# Patient Record
Sex: FEMALE | Race: WHITE | Employment: OTHER | URBAN - METROPOLITAN AREA
[De-identification: names, ages, dates, MRNs, and addresses within clinical notes are randomized per-mention and may not be internally consistent; named-entity substitution may affect disease eponyms.]

---

## 2017-03-17 ENCOUNTER — TELEPHONE (OUTPATIENT)
Dept: NEUROLOGY | Age: 72
End: 2017-03-17

## 2017-03-17 NOTE — TELEPHONE ENCOUNTER
Contacted patient back. She just wanted to ensure neurology was the correct specialty to help with her symptoms. She will be in for an appt on March 28 to discuss with Dr. Debi Apple.

## 2017-03-17 NOTE — TELEPHONE ENCOUNTER
----- Message from Milagros Stearns sent at 3/17/2017  9:14 AM EDT -----  Regarding:  Dr. Christiano Anderson    Message: Pt wanted to speak with nurse to ask some additional questions about her situation before her appt.  [phone] number [phone]

## 2017-03-28 ENCOUNTER — OFFICE VISIT (OUTPATIENT)
Dept: NEUROLOGY | Age: 72
End: 2017-03-28

## 2017-03-28 VITALS
WEIGHT: 116 LBS | HEART RATE: 79 BPM | HEIGHT: 64 IN | OXYGEN SATURATION: 96 % | BODY MASS INDEX: 19.81 KG/M2 | SYSTOLIC BLOOD PRESSURE: 150 MMHG | DIASTOLIC BLOOD PRESSURE: 98 MMHG

## 2017-03-28 DIAGNOSIS — M54.2 ACUTE NECK PAIN: Primary | ICD-10-CM

## 2017-03-28 RX ORDER — ALENDRONATE SODIUM 70 MG/1
TABLET ORAL
Refills: 0 | COMMUNITY
Start: 2017-03-09

## 2017-03-28 RX ORDER — ESTRADIOL 1 MG/1
TABLET ORAL
Refills: 2 | COMMUNITY
Start: 2016-12-31

## 2017-03-28 RX ORDER — PREDNISONE 5 MG/1
TABLET ORAL
Qty: 21 TAB | Refills: 0 | Status: SHIPPED | OUTPATIENT
Start: 2017-03-28

## 2017-03-28 RX ORDER — DICLOFENAC SODIUM 75 MG/1
75 TABLET, DELAYED RELEASE ORAL 2 TIMES DAILY
Qty: 28 TAB | Refills: 0 | Status: SHIPPED | OUTPATIENT
Start: 2017-03-28 | End: 2017-04-11

## 2017-03-28 RX ORDER — HYDROCODONE BITARTRATE AND ACETAMINOPHEN 10; 325 MG/1; MG/1
TABLET ORAL
Refills: 0 | COMMUNITY
Start: 2017-03-06

## 2017-03-28 RX ORDER — GABAPENTIN 300 MG/1
CAPSULE ORAL 3 TIMES DAILY
COMMUNITY
Start: 2017-03-27

## 2017-03-28 RX ORDER — LEVOTHYROXINE SODIUM 75 UG/1
TABLET ORAL
Refills: 1 | COMMUNITY
Start: 2017-03-08

## 2017-03-28 RX ORDER — MORPHINE SULFATE 30 MG/1
TABLET, FILM COATED, EXTENDED RELEASE ORAL
Refills: 0 | COMMUNITY
Start: 2017-03-06

## 2017-03-28 RX ORDER — ATORVASTATIN CALCIUM 40 MG/1
1 TABLET, FILM COATED ORAL DAILY
Refills: 5 | COMMUNITY
Start: 2017-03-08

## 2017-03-28 RX ORDER — PANTOPRAZOLE SODIUM 40 MG/1
TABLET, DELAYED RELEASE ORAL
Refills: 3 | COMMUNITY
Start: 2017-02-27

## 2017-03-28 RX ORDER — LISINOPRIL 20 MG/1
TABLET ORAL
Refills: 1 | COMMUNITY
Start: 2017-02-27

## 2017-03-28 NOTE — MR AVS SNAPSHOT
Visit Information Date & Time Provider Department Dept. Phone Encounter #  
 3/28/2017  1:00 PM Kavitha Mcnally MD [address] [place] [address] Neurology Clinic [phone] [id number] Follow-up Instructions Return if symptoms worsen or fail to improve. Upcoming Health Maintenance Date Due Hepatitis C Screening 1945 DTaP/Tdap/Td series (1 - Tdap) 4/30/1967 BREAST CANCER SCRN MAMMOGRAM 4/30/1996 FOBT Q 1 YEAR AGE 50-75 4/30/1996 ZOSTER VACCINE AGE 60> 4/30/2006 GLAUCOMA SCREENING Q2Y 4/30/2011 OSTEOPOROSIS SCREENING (DEXA) 4/30/2011 Pneumococcal 65+ Low/Medium Risk (1 of 2 - PCV13) 4/30/2011 MEDICARE YEARLY EXAM 4/30/2011 INFLUENZA AGE 9 TO ADULT 8/1/2016 Allergies as of 3/28/2017  Review Complete On: 3/28/2017 By: Christie Hogue LPN No Known Allergies Current Immunizations  Never Reviewed No immunizations on file. Not reviewed this visit You Were Diagnosed With   
  
 Codes Comments Acute neck pain    -  Primary ICD-10-CM: M54.2 ICD-9-CM: 723.1 Vitals BP Pulse Height(growth percentile) Weight(growth percentile) SpO2 BMI  
 (!) 150/98 (BP 1 Location: Left arm, BP Patient Position: Sitting) 79 5' 4\" (1.626 m) 116 lb (52.6 kg) 96% 19.91 kg/m2 Smoking Status Current Every Day Smoker Vitals History BMI and BSA Data Body Mass Index Body Surface Area  
 19.91 kg/m 2 1.54 m 2 Preferred Pharmacy Pharmacy Name Phone [facility] [address] [facility]  [place] [address] [phone] Your Updated Medication List  
  
   
This list is accurate as of: 3/28/17  1:37 PM.  Always use your most recent med list.  
  
  
  
  
 alendronate 70 mg tablet Commonly known as:  FOSAMAX TK 1 T PO Q 7 DAYS  
  
 atorvastatin 40 mg tablet Commonly known as:  LIPITOR Take 1 Tab by mouth daily. diclofenac EC 75 mg EC tablet Commonly known as:  VOLTAREN Take 1 Tab by mouth two (2) times a day for 14 days. estradiol 1 mg tablet Commonly known as:  ESTRACE TK 1 T PO QD  
  
 gabapentin 300 mg capsule Commonly known as:  NEURONTIN Take  by mouth three (3) times daily. HYDROcodone-acetaminophen  mg tablet Commonly known as:  Birda Hereford TK 1 T PO  QID  
  
 levothyroxine 75 mcg tablet Commonly known as:  SYNTHROID TK 1 T PO  QAM ON EMPTY STOMACH  
  
 lisinopril 20 mg tablet Commonly known as:  Robertha Roup TK 1 T PO QD  
  
 morphine CR 30 mg CR tablet Commonly known as:  MS CONTIN  
TK 1 T PO  Q 12 H  
  
 pantoprazole 40 mg tablet Commonly known as:  PROTONIX TK 1 T PO QAM 30 MINUTES BEFORE BREAKFAST predniSONE 5 mg dose pack Commonly known as:  STERAPRED See administration instruction per 5mg dose pack Prescriptions Sent to Pharmacy Refills  
 predniSONE (STERAPRED) 5 mg dose pack 0 Sig: See administration instruction per 5mg dose pack Class: Normal  
 Pharmacy: [facility] [address] [facility] Ph #: [phone]  
 diclofenac EC (VOLTAREN) 75 mg EC tablet 0 Sig: Take 1 Tab by mouth two (2) times a day for 14 days. Class: Normal  
 Pharmacy: [facility] [address] [facility] Ph #: [phone] Route: Oral  
  
Follow-up Instructions Return if symptoms worsen or fail to improve. Patient Instructions If you continue having neck pain, you may consider seeing either: 
 
Dr. Eddie Mathew, Interventional Pain: [phone] Or 
 
Dr. Jermaine Medina, Interventional Pain: [phone] Discuss any appointments with your Pain Management Doctor, Dr. Jovanny Flores Introducing [facility]!    
 Candice Fall introduces [employer] patient portal. Now you can access parts of your medical record, email your doctor's office, and request medication refills online. 1. In your internet browser, go to [email or web address] 2. Click on the First Time User? Click Here link in the Sign In box. You will see the New Member Sign Up page. 3. Enter your [employer] Access Code exactly as it appears below. You will not need to use this code after youve completed the sign-up process. If you do not sign up before the expiration date, you must request a new code. · [employer] Access Code: [email or web address] Expires: 6/26/2017  1:37 PM 
 
4. Enter the last four digits of your Social Security Number (xxxx) and Date of Birth (mm/dd/yyyy) as indicated and click Submit. You will be taken to the next sign-up page. 5. Create a [employer] ID. This will be your [employer] login ID and cannot be changed, so think of one that is secure and easy to remember. 6. Create a [employer] password. You can change your password at any time. 7. Enter your Password Reset Question and Answer. This can be used at a later time if you forget your password. 8. Enter your e-mail address. You will receive e-mail notification when new information is available in [address] 9. Click Sign Up. You can now view and download portions of your medical record. 10. Click the Download Summary menu link to download a portable copy of your medical information. If you have questions, please visit the Frequently Asked Questions section of the [employer] website. Remember, [employer] is NOT to be used for urgent needs. For medical emergencies, dial 911. Now available from your iPhone and Android! Please provide this summary of care documentation to your next provider. If you have any questions after today's visit, please call [phone].

## 2017-03-28 NOTE — PATIENT INSTRUCTIONS
If you continue having neck pain, you may consider seeing either:    Dr. Viktor Neely, Interventional Pain: [phone]    Or    Dr. Page Arriaga, Interventional Pain: [phone]    Discuss any appointments with your Pain Management Doctor, Dr. Wilson Form

## 2017-03-28 NOTE — PROGRESS NOTES
Name: SHAHEED RAYGOZA      :  1945    PCP:   No primary care provider on file. MRN:   [id number]  Referring:  Self    Chief Complaint:   Chief Complaint   Patient presents with    Neck Pain       HISTORY OF PRESENT ILLNESS:     This is a 80 y.o. female chronic lower back pain who presents for evaluation of neck pain. She is followed by Dr. Etelvina Sarkar for her chronic pain management regarding intractable lower back pain. She made this visit to discuss new-onset severe neck pain. She describes that around the very beginning of 2017, she had sudden, severe pain in the upper part of her neck, close to the base of her head. It hurt to turn it to either side and lay on her pillows. She took one pillow away, tried tylenol, some home remedies but pain was still there. She used ice packs and that seemed to help temporarily. At this point, the pain has subsided a little and she rates the pain as being moderate on most days (6-7/ 10). The pain is worse if she turns her head to the right side or looks up, or sleeping on too many pillows. When the pain is worse, she has a burning sensation radiating down to her left shoulder, but not into her arm. Complete Review of Systems: reviewed on intake H&P; refer to media section; otherwise as noted in HPI     No Known Allergies  Current Outpatient Prescriptions   Medication Sig Dispense Refill    atorvastatin (LIPITOR) 40 mg tablet Take 1 Tab by mouth daily. 5    lisinopril (PRINIVIL, ZESTRIL) 20 mg tablet TK 1 T PO QD  1    estradiol (ESTRACE) 1 mg tablet TK 1 T PO QD  2    levothyroxine (SYNTHROID) 75 mcg tablet TK 1 T PO  QAM ON EMPTY STOMACH  1    pantoprazole (PROTONIX) 40 mg tablet TK 1 T PO QAM 30 MINUTES BEFORE BREAKFAST  3    alendronate (FOSAMAX) 70 mg tablet TK 1 T PO Q 7 DAYS  0    gabapentin (NEURONTIN) 300 mg capsule Take  by mouth three (3) times daily.       HYDROcodone-acetaminophen (NORCO)  mg tablet TK 1 T PO  QID 0    morphine CR (MS CONTIN) 30 mg CR tablet TK 1 T PO  Q 12 H  0    predniSONE (STERAPRED) 5 mg dose pack See administration instruction per 5mg dose pack 21 Tab 0    diclofenac EC (VOLTAREN) 75 mg EC tablet Take 1 Tab by mouth two (2) times a day for 14 days. 28 Tab 0     History reviewed. No pertinent past medical history. Past Surgical History:   Procedure Laterality Date    HX HYSTERECTOMY      HX ROTATOR CUFF REPAIR       Family History   Problem Relation Age of Onset    No Known Problems Mother     No Known Problems Father      Social History     Social History    Marital status:      Spouse name: N/A    Number of children: N/A    Years of education: N/A     Occupational History    Not on file. Social History Main Topics    Smoking status: Current Every Day Smoker     Packs/day: 0.50     Types: Cigarettes    Smokeless tobacco: Not on file    Alcohol use No    Drug use: Not on file    Sexual activity: Not on file     Other Topics Concern    Not on file     Social History Narrative    No narrative on file       PHYSICAL EXAM  Blood pressure (!) 150/98, pulse 79, height 5' 4\" (1.626 m), weight 52.6 kg (116 lb), SpO2 96 %. General: Well-nourished, no acute distress. Neck: supple  Lungs: clear to auscultation  CV: regular rhythm, regular rate. MSK  Cervical spine:  + Tenderness in left mid-upper cervical area, none on right  + pain with neck extension and facet loading to right (pain on left side)  No pain with facet loading to right side    Focused Neurological Exam     Mental status: Alert and oriented to person, place situation. Language: normal fluency and comprehension; no dysarthria. CNs:   Visual fields grossly normal  Extraocular movements intact, no nystagmus  Face appears symmetric and facial strength normal.    Hearing is intact to casual conversation.      Sensory: Intact LT in both arms and legs  Motor: Normal bulk, tone, and strength in all 4 extremities. Reflexes: DTRs are symmetric, 2+ biceps and patellars  Gait: not observed  -----------------------------  No outside clinic notes/ imaging reports available for review    ASSESSMENT AND PLAN    ICD-10-CM ICD-9-CM    1. Acute neck pain M54.2 723.1 predniSONE (STERAPRED) 5 mg dose pack      diclofenac EC (VOLTAREN) 75 mg EC tablet       80 y.o. female with recent bout of severe left upper neck pain    Ongoing but mildly reduced in severity. Exam suggests pain is coming from mid-upper cervical facet arthropathy. Given Rx for PredPak to take x 1 and 14 day course of diclofenac. If pain persists, she'll discuss with Dr. Elayne George to see what other evaluation/ treatment she needs. I suggested to her that if she has persisting neck pain, she could consider seeing either Dr. Veronica Jernigan or Dr. Marjan Ambrocio here on the [facility] side closer to where she lives) to discuss any cervical injection options. No regular neurology follow up scheduled but welcome to follow up if needed.

## 2017-12-13 ENCOUNTER — OP HISTORICAL/CONVERTED ENCOUNTER (OUTPATIENT)
Dept: OTHER | Age: 72
End: 2017-12-13

## 2018-10-16 ENCOUNTER — OP HISTORICAL/CONVERTED ENCOUNTER (OUTPATIENT)
Dept: OTHER | Age: 73
End: 2018-10-16

## 2019-05-28 ENCOUNTER — OP HISTORICAL/CONVERTED ENCOUNTER (OUTPATIENT)
Dept: OTHER | Age: 74
End: 2019-05-28

## 2019-06-24 ENCOUNTER — IP HISTORICAL/CONVERTED ENCOUNTER (OUTPATIENT)
Dept: OTHER | Age: 74
End: 2019-06-24

## 2019-07-22 ENCOUNTER — OP HISTORICAL/CONVERTED ENCOUNTER (OUTPATIENT)
Dept: OTHER | Age: 74
End: 2019-07-22

## 2019-12-16 ENCOUNTER — ED HISTORICAL/CONVERTED ENCOUNTER (OUTPATIENT)
Dept: OTHER | Age: 74
End: 2019-12-16

## 2020-10-09 ENCOUNTER — TRANSCRIBE ORDER (OUTPATIENT)
Dept: REGISTRATION | Age: 75
End: 2020-10-09

## 2020-10-09 ENCOUNTER — HOSPITAL ENCOUNTER (OUTPATIENT)
Dept: GENERAL RADIOLOGY | Age: 75
Discharge: HOME | End: 2020-10-09
Payer: MEDICARE

## 2020-10-09 DIAGNOSIS — M54.2 CERVICAL SPINE PAIN: ICD-10-CM

## 2020-10-09 DIAGNOSIS — M54.2 CERVICAL SPINE PAIN: Primary | ICD-10-CM

## 2020-10-09 PROCEDURE — 72052 X-RAY EXAM NECK SPINE 6/>VWS: CPT

## 2020-10-09 PROCEDURE — 72110 X-RAY EXAM L-2 SPINE 4/>VWS: CPT

## 2022-03-19 PROBLEM — M54.2 ACUTE NECK PAIN: Status: ACTIVE | Noted: 2017-03-28

## 2023-05-24 RX ORDER — ESTRADIOL 1 MG/1
1 TABLET ORAL DAILY
COMMUNITY
Start: 2016-12-31

## 2023-05-24 RX ORDER — MORPHINE SULFATE 30 MG/1
TABLET, FILM COATED, EXTENDED RELEASE ORAL
COMMUNITY
Start: 2017-03-06

## 2023-05-24 RX ORDER — PANTOPRAZOLE SODIUM 40 MG/1
TABLET, DELAYED RELEASE ORAL
COMMUNITY
Start: 2017-02-27

## 2023-05-24 RX ORDER — PREDNISONE 5 MG/1
TABLET ORAL
COMMUNITY
Start: 2017-03-28

## 2023-05-24 RX ORDER — ATORVASTATIN CALCIUM 40 MG/1
1 TABLET, FILM COATED ORAL DAILY
COMMUNITY
Start: 2017-03-08

## 2023-05-24 RX ORDER — ALENDRONATE SODIUM 70 MG/1
TABLET ORAL
COMMUNITY
Start: 2017-03-09

## 2023-05-24 RX ORDER — LEVOTHYROXINE SODIUM 0.07 MG/1
TABLET ORAL
COMMUNITY
Start: 2017-03-08

## 2023-05-24 RX ORDER — LISINOPRIL 20 MG/1
1 TABLET ORAL DAILY
COMMUNITY
Start: 2017-02-27

## 2023-05-24 RX ORDER — HYDROCODONE BITARTRATE AND ACETAMINOPHEN 10; 325 MG/1; MG/1
TABLET ORAL
COMMUNITY
Start: 2017-03-06

## 2023-05-24 RX ORDER — GABAPENTIN 300 MG/1
CAPSULE ORAL 3 TIMES DAILY
COMMUNITY
Start: 2017-03-27

## 2024-01-16 ENCOUNTER — HOSPITAL ENCOUNTER (OUTPATIENT)
Facility: HOSPITAL | Age: 79
Discharge: HOME | End: 2024-01-19
Payer: MEDICARE

## 2024-01-16 DIAGNOSIS — M54.2 CERVICALGIA: ICD-10-CM

## 2024-01-16 DIAGNOSIS — M54.40 LOW BACK PAIN WITH SCIATICA, SCIATICA LATERALITY UNSPECIFIED, UNSPECIFIED BACK PAIN LATERALITY, UNSPECIFIED CHRONICITY: ICD-10-CM

## 2024-01-16 PROCEDURE — 72100 X-RAY EXAM L-S SPINE 2/3 VWS: CPT

## 2024-01-16 PROCEDURE — 72050 X-RAY EXAM NECK SPINE 4/5VWS: CPT

## 2024-01-16 PROCEDURE — 72070 X-RAY EXAM THORAC SPINE 2VWS: CPT
